# Patient Record
Sex: MALE | Race: WHITE
[De-identification: names, ages, dates, MRNs, and addresses within clinical notes are randomized per-mention and may not be internally consistent; named-entity substitution may affect disease eponyms.]

---

## 2020-02-01 ENCOUNTER — HOSPITAL ENCOUNTER (EMERGENCY)
Dept: HOSPITAL 97 - ER | Age: 45
Discharge: HOME | End: 2020-02-01
Payer: SELF-PAY

## 2020-02-01 VITALS — DIASTOLIC BLOOD PRESSURE: 82 MMHG | SYSTOLIC BLOOD PRESSURE: 127 MMHG

## 2020-02-01 VITALS — TEMPERATURE: 97.8 F | OXYGEN SATURATION: 100 %

## 2020-02-01 DIAGNOSIS — R07.9: Primary | ICD-10-CM

## 2020-02-01 DIAGNOSIS — I10: ICD-10-CM

## 2020-02-01 DIAGNOSIS — F17.210: ICD-10-CM

## 2020-02-01 LAB
ALBUMIN SERPL BCP-MCNC: 3.9 G/DL (ref 3.4–5)
ALP SERPL-CCNC: 68 U/L (ref 45–117)
ALT SERPL W P-5'-P-CCNC: 25 U/L (ref 12–78)
AST SERPL W P-5'-P-CCNC: 16 U/L (ref 15–37)
BUN BLD-MCNC: 13 MG/DL (ref 7–18)
GLUCOSE SERPLBLD-MCNC: 103 MG/DL (ref 74–106)
HCT VFR BLD CALC: 44.2 % (ref 39.6–49)
INR BLD: 1
LYMPHOCYTES # SPEC AUTO: 2.6 K/UL (ref 0.7–4.9)
MAGNESIUM SERPL-MCNC: 1.9 MG/DL (ref 1.8–2.4)
NT-PROBNP SERPL-MCNC: 16 PG/ML (ref ?–125)
PMV BLD: 8.5 FL (ref 7.6–11.3)
POTASSIUM SERPL-SCNC: 4.2 MMOL/L (ref 3.5–5.1)
RBC # BLD: 4.79 M/UL (ref 4.33–5.43)
TROPONIN (EMERG DEPT USE ONLY): < 0.02 NG/ML (ref 0–0.04)

## 2020-02-01 PROCEDURE — 36415 COLL VENOUS BLD VENIPUNCTURE: CPT

## 2020-02-01 PROCEDURE — 80076 HEPATIC FUNCTION PANEL: CPT

## 2020-02-01 PROCEDURE — 85025 COMPLETE CBC W/AUTO DIFF WBC: CPT

## 2020-02-01 PROCEDURE — 99285 EMERGENCY DEPT VISIT HI MDM: CPT

## 2020-02-01 PROCEDURE — 84484 ASSAY OF TROPONIN QUANT: CPT

## 2020-02-01 PROCEDURE — 93005 ELECTROCARDIOGRAM TRACING: CPT

## 2020-02-01 PROCEDURE — 83735 ASSAY OF MAGNESIUM: CPT

## 2020-02-01 PROCEDURE — 85379 FIBRIN DEGRADATION QUANT: CPT

## 2020-02-01 PROCEDURE — 85610 PROTHROMBIN TIME: CPT

## 2020-02-01 PROCEDURE — 71045 X-RAY EXAM CHEST 1 VIEW: CPT

## 2020-02-01 PROCEDURE — 83880 ASSAY OF NATRIURETIC PEPTIDE: CPT

## 2020-02-01 PROCEDURE — 80048 BASIC METABOLIC PNL TOTAL CA: CPT

## 2020-02-01 NOTE — ER
Nurse's Notes                                                                                     

 [facility]                                                                 

Name: Varghese Johnston                                                                               

Age: 44 yrs                                                                                       

Sex: Male                                                                                         

: 1975                                                                                   

MRN: [id number]                                                                                   

Arrival Date: 2020                                                                          

Time: 13:10                                                                                       

Account#: [id number]                                                                            

Bed 14                                                                                            

Private MD:                                                                                       

Diagnosis: Chest pain, unspecified                                                                

                                                                                                  

Presentation:                                                                                     

                                                                                             

13:13 Presenting complaint: Intermittent sharp left sided chest pain upon waking today. Also  hb  

      c/o headache, nausea, and mild SOB. Denies cough/fever. Transition of care: patient was     

      not received from another setting of care. Onset of symptoms was 2020.         

      Risk Assessment: Do you want to hurt yourself or someone else? Patient reports no           

      desire to harm self or others. Initial Sepsis Screen: Does the patient meet any 2           

      criteria? No. Patient's initial sepsis screen is negative. Does the patient have a          

      suspected source of infection? No. Patient's initial sepsis screen is negative. Care        

      prior to arrival: None.                                                                     

13:13 Method Of Arrival: Ambulatory                                                           hb  

13:13 Method Of Arrival: Ambulatory                                                           hb  

13:13 Acuity: AGAPITO 3                                                                           hb  

                                                                                                  

Historical:                                                                                       

- Allergies:                                                                                      

13:15 No Known Allergies;                                                                     hb  

- Home Meds:                                                                                      

13:15 levothyroxine oral [Active];                                                            hb  

- PMHx:                                                                                           

13:15 Broken Neck; Hypertension; Kidney stones;                                               hb  

- PSHx:                                                                                           

13:15 ACDF C4-C5; Stent in Ureter; Lithotripsy;                                               hb  

                                                                                                  

- Immunization history:: Adult Immunizations up to date.                                          

- Coronavirus screen:: The patient has NOT traveled to [place], [place], or [place] in the           

  past 14 days. The patient has NOT had contact with known/suspected case of                      

  Coronavirus? Proceed with normal triage procedures.                                             

- Social history:: Smoking status: Patient reports the use of cigarette tobacco                   

  products, smokes one pack cigarettes per day.                                                   

- Ebola Screening: : No symptoms or risks identified at this time.                                

                                                                                                  

                                                                                                  

Screenin:42 Abuse screen: Denies threats or abuse. Denies injuries from another. Nutritional        aj1 

      screening: No deficits noted. Tuberculosis screening: No symptoms or risk factors           

      identified.                                                                                 

19:11 Fall Risk None identified.                                                              aj1 

                                                                                                  

Assessment:                                                                                       

13:42 General: Appears in no apparent distress. comfortable, Behavior is calm, cooperative,   aj1 

      appropriate for age. Pain: Complains of pain in anterior aspect of left upper chest         

      Pain does not radiate. Pain currently is 8 out of 10 on a pain scale. Quality of pain       

      is described as aching, Pain began upon waking this morning. Neuro: Level of                

      Consciousness is awake, alert, obeys commands, Oriented to person, place, time,             

      situation. Cardiovascular: Reports chest pain, Heart tones S1 S2 present Patient's skin     

      is warm and dry. Rhythm is sinus rhythm. Respiratory: Airway is patent Respiratory          

      effort is even, unlabored, Respiratory pattern is regular, symmetrical, Breath sounds       

      are clear bilaterally. GI: No signs and/or symptoms were reported involving the             

      gastrointestinal system. : No signs and/or symptoms were reported regarding the           

      genitourinary system. EENT: No signs and/or symptoms were reported regarding the EENT       

      system. Derm: No signs and/or symptoms reported regarding the dermatologic system. Skin     

      is pink, warm \T\ dry. normal. Musculoskeletal: No signs and/or symptoms reported           

      regarding the musculoskeletal system. Circulation, motion, and sensation intact.            

14:38 Reassessment: Patient appears in no apparent distress at this time. No changes from     aj1 

      previously documented assessment. Patient and/or family updated on plan of care and         

      expected duration. Pain level reassessed. Patient is alert, oriented x 3, equal             

      unlabored respirations, skin warm/dry/pink.                                                 

15:54 Reassessment: Patient appears in no apparent distress at this time. No changes from     aj1 

      previously documented assessment. Patient and/or family updated on plan of care and         

      expected duration. Pain level reassessed. Patient is alert, oriented x 3, equal             

      unlabored respirations, skin warm/dry/pink.                                                 

16:49 Reassessment: Patient and/or family updated on plan of care and expected duration. Pain aj1 

      level reassessed. General: Appears in no apparent distress. comfortable, Behavior is        

      calm, cooperative, appropriate for age. Pain:. Neuro: Level of Consciousness is awake,      

      alert, obeys commands, Oriented to person, place, time, situation. Cardiovascular:          

      Heart tones S1 S2 present Patient's skin is warm and dry. Rhythm is sinus rhythm.           

      Respiratory: Airway is patent Respiratory effort is even, unlabored, Respiratory            

      pattern is regular, symmetrical. Derm: No signs and/or symptoms reported regarding the      

      dermatologic system. Skin is pink, warm \T\ dry. normal.                                    

17:45 Reassessment: Patient appears in no apparent distress at this time. No changes from     aj1 

      previously documented assessment. Patient and/or family updated on plan of care and         

      expected duration. Pain level reassessed. Patient is alert, oriented x 3, equal             

      unlabored respirations, skin warm/dry/pink.                                                 

18:45 Reassessment: Patient appears in no apparent distress at this time. No changes from     aj1 

      previously documented assessment. Patient and/or family updated on plan of care and         

      expected duration. Pain level reassessed. Patient is alert, oriented x 3, equal             

      unlabored respirations, skin warm/dry/pink.                                                 

                                                                                                  

Vital Signs:                                                                                      

13:14  / 96; Pulse 101; Resp 16; Temp 97.8; Pulse Ox 100% on R/A; Weight 108.86 kg;     hb  

      Height 5 ft. 10 in. (177.80 cm); Pain 7/10;                                                 

15:54  / 82; Pulse 84; Resp 18; Pulse Ox 100% on R/A;                                   aj1 

13:14 Body Mass Index 34.44 (108.86 kg, 177.80 cm)                                            hb  

                                                                                                  

ED Course:                                                                                        

13:10 Patient arrived in ED.                                                                  mr  

13:14 Triage completed.                                                                       hb  

13:14 Arm band placed on.                                                                     hb  

13:16 Josue Sumner PA is PHCP.                                                              [facility] 

13:16 Leo Taylor MD is Attending Physician.                                             [facility] 

13:18 Holly Purcell, RN is Primary Nurse.                                                   aj1 

13:42 Patient has correct armband on for positive identification. Cardiac monitor on. Pulse   aj1 

      ox on. NIBP on.                                                                             

13:42 No provider procedures requiring assistance completed. Initial lab(s) drawn, sent to    [facility] 

      lab. Inserted saline lock: 20 gauge in right antecubital area, using aseptic technique.     

      Blood collected. Patient maintains SpO2 saturation greater than 95% on room air.            

13:55 XRAY Chest (1 view) In Process Unspecified.                                             EDMS

19:10 IV discontinued, intact, bleeding controlled, No redness/swelling at site. Pressure     aj1 

      dressing applied.                                                                           

                                                                                                  

Administered Medications:                                                                         

No medications were administered                                                                  

                                                                                                  

                                                                                                  

Outcome:                                                                                          

18:39 Discharge ordered by MD.                                                                [facility] 

19:11 Discharged to home ambulatory.                                                          aj1 

19:11 Condition: good                                                                             

19:11 Discharge instructions given to patient, Instructed on discharge instructions, follow       

      up and referral plans. Demonstrated understanding of instructions, follow-up care.          

19:12 Patient left the ED.                                                                    aj 

                                                                                                  

Signatures:                                                                                       

Dispatcher [employer]                           EDMS                                                 

Binh, Holly, RN                     RN   aj1                                                  

Josue Sumner PA PA jmm Rivera Maddie                                 mr                                                   

Elham Julien, SHYANNE                     RN   hb                                                   

                                                                                                  

**************************************************************************************************

## 2020-02-01 NOTE — RAD REPORT
EXAM DESCRIPTION:  RAD - Chest Single View - 2/1/2020 1:54 pm

 

CLINICAL HISTORY:  CHEST PAIN

Chest pain.

 

COMPARISON:  Chest Single View dated 4/13/2017

 

FINDINGS:  Portable technique limits examination quality.

 

The lungs are grossly clear. The heart is normal in size. No displaced fractures.

 

IMPRESSION:  No acute intrathoracic process suspected.

## 2020-02-01 NOTE — EDPHYS
Physician Documentation                                                                           

 [facility]                                                                 

Name: Varghese Johnston                                                                               

Age: 44 yrs                                                                                       

Sex: Male                                                                                         

: 1975                                                                                   

MRN: [id number]                                                                                   

Arrival Date: 2020                                                                          

Time: 13:10                                                                                       

Account#: [id number]                                                                            

Bed 14                                                                                            

Private MD:                                                                                       

ED Physician Leo Taylor                                                                      

HPI:                                                                                              

                                                                                             

13:26 This 44 yrs old  Male presents to ER via Ambulatory with complaints of Chest   jmm 

      Pain.                                                                                       

13:26 The patient or guardian reports chest pain that is located primarily in the substernal  [facility] 

      area. Onset: this morning. The pain does not radiate. Associated signs and symptoms:        

      Pertinent positives: headache, shortness of breath, Pertinent negatives: abdominal          

      pain. The chest pain is described as aching, a pressure, sharp. Duration: The patient       

      or guardian reports a single episode, that is still ongoing, that lasted 4 hour(s).         

      Modifying factors: The symptoms are alleviated by nothing. the symptoms are aggravated      

      by nothing. This is a 44 year old male with a history of htn, hlp that presents to the      

      ED with complaints of substernal chest pain which began as he awoke today at 0800.          

      Patient describes the pain as pressure. Which alternates with sharp pain. Patient does      

      smoke. Family hx of CAD.                                                                    

                                                                                                  

Historical:                                                                                       

- Allergies:                                                                                      

13:15 No Known Allergies;                                                                     hb  

- Home Meds:                                                                                      

13:15 levothyroxine oral [Active];                                                            hb  

- PMHx:                                                                                           

13:15 Broken Neck; Hypertension; Kidney stones;                                               hb  

- PSHx:                                                                                           

13:15 ACDF C4-C5; Stent in Ureter; Lithotripsy;                                               hb  

                                                                                                  

- Immunization history:: Adult Immunizations up to date.                                          

- Coronavirus screen:: The patient has NOT traveled to [place], [place], or [place] in the           

  past 14 days. The patient has NOT had contact with known/suspected case of                      

  Coronavirus? Proceed with normal triage procedures.                                             

- Social history:: Smoking status: Patient reports the use of cigarette tobacco                   

  products, smokes one pack cigarettes per day.                                                   

- Ebola Screening: : No symptoms or risks identified at this time.                                

                                                                                                  

                                                                                                  

ROS:                                                                                              

13:26 Constitutional: Negative for fever, chills, and weight loss.                            jmm 

13:26 Cardiovascular: Positive for chest pain.                                                    

13:26 Abdomen/GI: Positive for nausea.                                                            

13:26 Neuro: Positive for headache.                                                               

13:26 All other systems are negative.                                                             

                                                                                                  

Exam:                                                                                             

13:26 Constitutional:  This is a well developed, well nourished patient who is awake, alert,  jmm 

      and in no acute distress. Head/Face:  atraumatic. Eyes:  EOMI, no conjunctival erythema     

      appreciated ENT:  Moist Mucus Membranes Neck:  Trachea midline, Supple Chest/axilla:        

      Normal chest wall appearance and motion.                                                    

13:26 Abdomen/GI:  Non distended, soft Back:  Normal ROM Skin:  General appearance color          

      normal MS/ Extremity:  Moves all extremities, no obvious deformities appreciated, no        

      edema noted to the lower extremities  Neuro:  Awake and alert, normal gait Psych:           

      Behavior is normal, Mood is normal, Patient is cooperative and pleasant                     

13:26 Cardiovascular: Rate: normal, Rhythm: regular.                                              

13:26 Respiratory: the patient does not display signs of respiratory distress,  Respirations:     

      normal, Breath sounds: are clear throughout.                                                

                                                                                                  

Vital Signs:                                                                                      

13:14  / 96; Pulse 101; Resp 16; Temp 97.8; Pulse Ox 100% on R/A; Weight 108.86 kg;     hb  

      Height 5 ft. 10 in. (177.80 cm); Pain 7/10;                                                 

15:54  / 82; Pulse 84; Resp 18; Pulse Ox 100% on R/A;                                   aj1 

13:14 Body Mass Index 34.44 (108.86 kg, 177.80 cm)                                            hb  

                                                                                                  

MDM:                                                                                              

13:19 Patient medically screened.                                                             benito 

18:38 Data reviewed: vital signs, nurses notes. ED course: HEART SCORE IS 2. REPEAT TROPONIN  jm 

      NORMAL. CHEST PAIN HAS RESOLVED. PATIENT ADVISED TO FOLLOW UP WITH PCP AND OTHERWISE        

      GIVEN STRICT RETURN PRECAUTIONS. .                                                          

                                                                                                  

                                                                                             

13:26 Order name: Basic Metabolic Panel; Complete Time: 14:12                                 [facility] 

                                                                                             

13:26 Order name: CBC with Diff; Complete Time: 13:56                                         [facility] 

                                                                                             

13:26 Order name: LFT's; Complete Time: 14:12                                                 [facility] 

                                                                                             

13:26 Order name: Magnesium; Complete Time: 14:12                                             [facility] 

                                                                                             

13:26 Order name: NT PRO-BNP; Complete Time: 14:12                                            [facility] 

                                                                                             

13:26 Order name: PT-INR; Complete Time: 13:56                                                [facility] 

                                                                                             

13:26 Order name: Troponin (emerg Dept Use Only); Complete Time: 14:12                        [facility] 

                                                                                             

13:26 Order name: XRAY Chest (1 view); Complete Time: 14:58                                   [facility] 

                                                                                             

13:26 Order name: EKG; Complete Time: 13:29                                                   [facility] 

                                                                                             

13:26 Order name: Cardiac monitoring; Complete Time: 13:30                                    [facility] 

                                                                                             

13:26 Order name: EKG - Nurse/Tech; Complete Time: 13:41                                      [facility] 

                                                                                             

13:26 Order name: IV Saline Lock; Complete Time: 13:41                                        [facility] 

                                                                                             

15:11 Order name: D-Dimer; Complete Time: 15:30                                               [facility] 

                                                                                             

17:37 Order name: Troponin (emerg Dept Use Only); Complete Time: 18:32                        [facility] 

                                                                                             

13:26 Order name: Labs collected and sent; Complete Time: 13:41                               [facility] 

                                                                                             

13:26 Order name: O2 Per Protocol; Complete Time: 13:30                                       [facility] 

                                                                                             

13:26 Order name: O2 Sat Monitoring; Complete Time: 13:30                                     [facility] 

                                                                                                  

Administered Medications:                                                                         

No medications were administered                                                                  

                                                                                                  

                                                                                                  

Disposition:                                                                                      

20 18:39 Discharged to Home. Impression: Chest pain, unspecified.                           

- Condition is Stable.                                                                            

- Discharge Instructions: Nonspecific Chest Pain.                                                 

                                                                                                  

- Medication Reconciliation Form, Thank You Letter, Antibiotic Education, Prescription            

  Opioid Use form.                                                                                

- Follow up: Private Physician; When: 2 - 3 days; Reason: Recheck today's complaints,             

  Continuance of care, Re-evaluation by your physician.                                           

                                                                                                  

                                                                                                  

                                                                                                  

Addendum:                                                                                         

2020                                                                                        

     07:29 Co-signature as Attending Physician, Leo Taylor MD I agree with the assessment and  c
ha

           plan of care.                                                                          

                                                                                                  

Signatures:                                                                                       

Dispatcher [employer]                           Holly Patiño RN                     RN   aj1                                                  

Leo Taylor MD MD cha Mickail, Joel, PA                       PA   m                                                  

Elham Julien RN RN                                                      

                                                                                                  

Corrections: (The following items were deleted from the chart)                                    

                                                                                             

19:12 18:39 2020 18:39 Discharged to Home. Impression: Chest pain, unspecified.         aj1 

      Condition is Stable. Forms are Medication Reconciliation Form, Thank You Letter,            

      Antibiotic Education, Prescription Opioid Use. Follow up: Private Physician; When: 2 -      

      3 days; Reason: Recheck today's complaints, Continuance of care, Re-evaluation by your      

      physician. [facility]                                                                              

                                                                                                  

**************************************************************************************************

## 2020-02-03 NOTE — EKG
Test Date:    2020-02-01               Test Time:    13:40:06

Technician:   ELISEO                                    

                                                     

MEASUREMENT RESULTS:                                       

Intervals:                                           

Rate:         88                                     

DE:           164                                    

QRSD:         84                                     

QT:           344                                    

QTc:          416                                    

Axis:                                                

P:            46                                     

DE:           164                                    

QRS:          68                                     

T:            55                                     

                                                     

INTERPRETIVE STATEMENTS:                                       

                                                     

Normal sinus rhythm

Normal ECG

Compared to ECG 04/13/2017 07:35:41

No significant changes



Electronically Signed On 02-03-20 05:29:36 CST by Sukhjinder Castillo

## 2020-10-01 ENCOUNTER — HOSPITAL ENCOUNTER (INPATIENT)
Dept: HOSPITAL 97 - ER | Age: 45
LOS: 2 days | Discharge: HOME | DRG: 391 | End: 2020-10-03
Attending: INTERNAL MEDICINE | Admitting: FAMILY MEDICINE
Payer: SELF-PAY

## 2020-10-01 VITALS — BODY MASS INDEX: 36.3 KG/M2

## 2020-10-01 DIAGNOSIS — I10: ICD-10-CM

## 2020-10-01 DIAGNOSIS — E03.9: ICD-10-CM

## 2020-10-01 DIAGNOSIS — E86.0: ICD-10-CM

## 2020-10-01 DIAGNOSIS — K76.0: ICD-10-CM

## 2020-10-01 DIAGNOSIS — Q60.0: ICD-10-CM

## 2020-10-01 DIAGNOSIS — Z91.14: ICD-10-CM

## 2020-10-01 DIAGNOSIS — N17.9: ICD-10-CM

## 2020-10-01 DIAGNOSIS — U07.1: ICD-10-CM

## 2020-10-01 DIAGNOSIS — N26.1: ICD-10-CM

## 2020-10-01 DIAGNOSIS — K52.9: Primary | ICD-10-CM

## 2020-10-01 DIAGNOSIS — F17.290: ICD-10-CM

## 2020-10-01 LAB
ALBUMIN SERPL BCP-MCNC: 4.4 G/DL (ref 3.4–5)
ALP SERPL-CCNC: 75 U/L (ref 45–117)
ALT SERPL W P-5'-P-CCNC: 30 U/L (ref 12–78)
AST SERPL W P-5'-P-CCNC: 11 U/L (ref 15–37)
BUN BLD-MCNC: 20 MG/DL (ref 7–18)
GLUCOSE SERPLBLD-MCNC: 120 MG/DL (ref 74–106)
HCT VFR BLD CALC: 54.9 % (ref 39.6–49)
LIPASE SERPL-CCNC: 39 U/L (ref 73–393)
LYMPHOCYTES # SPEC AUTO: 1.7 K/UL (ref 0.7–4.9)
PMV BLD: 8.7 FL (ref 7.6–11.3)
POTASSIUM SERPL-SCNC: 3.8 MMOL/L (ref 3.5–5.1)
RBC # BLD: 6.11 M/UL (ref 4.33–5.43)
UA COMPLETE W REFLEX CULTURE PNL UR: (no result)
UA DIPSTICK W REFLEX MICRO PNL UR: (no result)
URINE COARSE GRANULAR CASTS: (no result) /LPF

## 2020-10-01 PROCEDURE — 83970 ASSAY OF PARATHORMONE: CPT

## 2020-10-01 PROCEDURE — 82274 ASSAY TEST FOR BLOOD FECAL: CPT

## 2020-10-01 PROCEDURE — 85610 PROTHROMBIN TIME: CPT

## 2020-10-01 PROCEDURE — 84156 ASSAY OF PROTEIN URINE: CPT

## 2020-10-01 PROCEDURE — 87324 CLOSTRIDIUM AG IA: CPT

## 2020-10-01 PROCEDURE — 99285 EMERGENCY DEPT VISIT HI MDM: CPT

## 2020-10-01 PROCEDURE — 84439 ASSAY OF FREE THYROXINE: CPT

## 2020-10-01 PROCEDURE — 96374 THER/PROPH/DIAG INJ IV PUSH: CPT

## 2020-10-01 PROCEDURE — 83735 ASSAY OF MAGNESIUM: CPT

## 2020-10-01 PROCEDURE — 82565 ASSAY OF CREATININE: CPT

## 2020-10-01 PROCEDURE — 83690 ASSAY OF LIPASE: CPT

## 2020-10-01 PROCEDURE — 81003 URINALYSIS AUTO W/O SCOPE: CPT

## 2020-10-01 PROCEDURE — 87045 FECES CULTURE AEROBIC BACT: CPT

## 2020-10-01 PROCEDURE — 76770 US EXAM ABDO BACK WALL COMP: CPT

## 2020-10-01 PROCEDURE — 87177 OVA AND PARASITES SMEARS: CPT

## 2020-10-01 PROCEDURE — 94010 BREATHING CAPACITY TEST: CPT

## 2020-10-01 PROCEDURE — 85025 COMPLETE CBC W/AUTO DIFF WBC: CPT

## 2020-10-01 PROCEDURE — 82570 ASSAY OF URINE CREATININE: CPT

## 2020-10-01 PROCEDURE — 87046 STOOL CULTR AEROBIC BACT EA: CPT

## 2020-10-01 PROCEDURE — 80048 BASIC METABOLIC PNL TOTAL CA: CPT

## 2020-10-01 PROCEDURE — 87449 NOS EACH ORGANISM AG IA: CPT

## 2020-10-01 PROCEDURE — 74177 CT ABD & PELVIS W/CONTRAST: CPT

## 2020-10-01 PROCEDURE — 81015 MICROSCOPIC EXAM OF URINE: CPT

## 2020-10-01 PROCEDURE — 87209 SMEAR COMPLEX STAIN: CPT

## 2020-10-01 PROCEDURE — 84443 ASSAY THYROID STIM HORMONE: CPT

## 2020-10-01 PROCEDURE — 80076 HEPATIC FUNCTION PANEL: CPT

## 2020-10-01 PROCEDURE — 36415 COLL VENOUS BLD VENIPUNCTURE: CPT

## 2020-10-01 PROCEDURE — 87040 BLOOD CULTURE FOR BACTERIA: CPT

## 2020-10-01 PROCEDURE — 96361 HYDRATE IV INFUSION ADD-ON: CPT

## 2020-10-01 RX ADMIN — ENOXAPARIN SODIUM SCH MG: 40 INJECTION SUBCUTANEOUS at 17:37

## 2020-10-01 RX ADMIN — DEXTROSE AND SODIUM CHLORIDE SCH MLS: 5; .45 INJECTION, SOLUTION INTRAVENOUS at 13:45

## 2020-10-01 RX ADMIN — HYDROCODONE BITARTRATE AND ACETAMINOPHEN PRN TAB: 7.5; 325 TABLET ORAL at 21:17

## 2020-10-01 RX ADMIN — TAZOBACTAM SODIUM AND PIPERACILLIN SODIUM SCH MLS: 250; 2 INJECTION, SOLUTION INTRAVENOUS at 13:45

## 2020-10-01 RX ADMIN — DEXTROSE AND SODIUM CHLORIDE SCH: 5; .45 INJECTION, SOLUTION INTRAVENOUS at 20:00

## 2020-10-01 RX ADMIN — DEXTROSE AND SODIUM CHLORIDE SCH MLS: 5; .45 INJECTION, SOLUTION INTRAVENOUS at 21:52

## 2020-10-01 RX ADMIN — TAZOBACTAM SODIUM AND PIPERACILLIN SODIUM SCH MLS: 250; 2 INJECTION, SOLUTION INTRAVENOUS at 17:37

## 2020-10-01 RX ADMIN — Medication SCH: at 20:47

## 2020-10-01 NOTE — P.CNS
Date of Consult: 10/01/20


PC:  I was asked to see this 45-year-old male regards to abdominal pain and 

possible appendicitis.





HPC:  Patient has had about a 4 day history of abdominal pain. Describes it as 

around his emboli kiss and mostly on the left side but also the right upper 

quadrant as well. Says he feels like he is hard cramps, and has been having 

diarrhea for the last 3 or 4 days.





PMH:  Has had some type of kidney issues in the past.  He was apparently told he

had a nonfunctioning kidney.  Has had stones on that side.





PSHx:  Previous ureteric stent placement





SOC:  No known allergies





SYS REVIEW:  States he has otherwise been in good health.











O/E awake alert not in any acute distress at the moment but looks tired.  Vital 

signs are stable








HEENT:  Nonicteric





Chest:  Chest movement equal bilaterally





ABD:  Soft nontender no guarding or rebound





LOCO:  Intact





DATA:  Elevated white cell count, CT scan suggestive of enteritis and/or early 

appendicitis





IMPRESSION:  This patient has no peritoneal signs. This is most likely a 

gastroenteritis from his symptoms and history





PLAN:  Continue antibiotics, stool cultures, and IV hydration.  Repeat CBC in 

a.m..  I do not feel this is a surgical issue at this time.

## 2020-10-01 NOTE — EDPHYS
Office Visit    Assessment AND Plan     diabetes, continues to have fluctuation in her sugar readings, I ordered a A1c, continue current regimen and keep appt with endocrinologist   history DVT, no recurrence of symptoms. INR therapeutic last month, repeat today  Anxiety with depression, controlled, contain current regimen      CHIEF COMPLAINT    Follow-up (6 month follow-up)        History of present illness    She is here today for follow-up a  Has diabetes, continues to have fluctuation in her sugar readings, under care of endocrinologist, not reporting any hypoglycemia  On Coumadin for history of DVT and PE, no peripheral chest pain  Has mild lower abdominal pain, recent UA done at the nephrologist revealed possible bladder infection, the patient does not have any hematuria no dysuria  On Prozac for anxiety with depression, symptoms fairly controlled     I have reviewed the past medical, family and social history sections including the medications and allergies listed in the above medical record as well as the nursing notes.     Review of systems    No orthopnea PND    Physical Exam    Vital Signs:  Blood pressure 136/82, pulse 86, resp. rate 16, height 5' 7\" (1.702 m), weight 71.3 kg.  Constitutional:  No acute distress.  Integument:  Warm.  Dry.  No erythema.  No rash.    HENT:  Normocephalic.  Atraumatic.  Bilateral external ears normal.  Oropharynx moist.  No oral exudates.  Nose normal.   Neck:  Normal range of motion.  No tenderness.  Supple.  No stridor.    Eyes:  PERRL (Pupils equal, round, reactive to light), EOMI (extraocular movements intact).  Conjunctivae normal.  No discharge.    Cardiovascular:  Normal heart rate.  Normal rhythm.  No murmurs.  No rubs.  No gallops.    Respiratory:  Normal breath sounds.  No respiratory distress.  No wheezing.  No chest tenderness.    Gastrointestinal:  Bowel sounds normal.  Soft.  No tenderness.  No masses.  No pulsatile masses.  Status post colostomy  Physician Documentation                                                                           

 [facility]                                                                 

Name: Varghese Johnston                                                                               

Age: 45 yrs                                                                                       

Sex: Male                                                                                         

: 1975                                                                                   

MRN: [id number]                                                                                   

Arrival Date: 10/01/2020                                                                          

Time: 08:09                                                                                       

Account#: [id number]                                                                            

Bed 7                                                                                             

Private MD: Nayla Peñaloza                                                                        

ED Physician Dionicio Summers                                                                       

HPI:                                                                                              

10/01                                                                                             

09:47 This 45 yrs old  Male presents to ER via Ambulatory with complaints of         kdr 

      Abdominal Pain, Vomiting/Diarrhea.                                                          

09:47 The patient presents to the emergency department with nausea, that is mild, vomiting,   kdr 

      that is intermittent, diarrhea, that is intermittent, abdominal pain, of the abdomen        

      diffusely, described as achy, and does not radiate. Onset: The symptoms/episode             

      began/occurred gradually, 4 day(s) ago. Possible causes: unknown. The symptoms are          

      aggravated by nothing. The symptoms are alleviated by food . Associated signs and           

      symptoms: Pertinent positives: abdominal pain, anorexia, diarrhea, nausea, vomiting.        

      Severity of symptoms: At their worst the symptoms were mild moderate just prior to          

      arrival, in the emergency department the symptoms are unchanged. The patient has not        

      experienced similar symptoms in the past. The patient has not recently seen a physician.    

                                                                                                  

Historical:                                                                                       

- Allergies:                                                                                      

08:24 No Known Allergies;                                                                     ss  

- Home Meds:                                                                                      

08:24 "supposed to take thyroid medicaiton" [Active];                                         ss  

- PMHx:                                                                                           

08:24 Broken Neck; Kidney stones; Hypothyroidism;                                             ss  

- PSHx:                                                                                           

08:24 neck surgery;                                                                           ss  

                                                                                                  

- Immunization history:: Adult Immunizations up to date.                                          

- Social history:: Smoking status: Patient reports use of chewing tobacco.                        

                                                                                                  

                                                                                                  

ROS:                                                                                              

09:47 Constitutional: Negative for fever, chills, and weight loss, Eyes: Negative for injury, kdr 

      pain, redness, and discharge, ENT: Negative for injury, pain, and discharge, Neck:          

      Negative for injury, pain, and swelling, Cardiovascular: Negative for chest pain,           

      palpitations, and edema, Respiratory: Negative for shortness of breath, cough,              

      wheezing, and pleuritic chest pain, Back: Negative for injury and pain, : Negative        

      for injury, bleeding, discharge, and swelling, MS/Extremity: Negative for injury and        

      deformity, Skin: Negative for injury, rash, and discoloration, Neuro: Negative for          

      headache, weakness, numbness, tingling, and seizure activity. Psych: Negative for           

      depression, anxiety, suicide ideation, homicidal ideation, and hallucinations,              

      Allergy/Immunology: Negative for hives, rash, and allergies, Endocrine: Negative for        

      neck swelling, polydipsia, polyuria, polyphagia, and marked weight changes,                 

      Hematologic/Lymphatic: Negative for swollen nodes, abnormal bleeding, and unusual           

      bruising.                                                                                   

09:47 Abdomen/GI: Positive for abdominal pain, nausea, vomiting, and diarrhea, Negative for       

      anorexia, dysphagia, black/tarry stool, rectal pain, rectal bleeding, bowel                 

      incontinence.                                                                               

                                                                                                  

Exam:                                                                                             

09:47 Constitutional:  This is a well developed, well nourished patient who is awake, alert,  kdr 

      and in no acute distress. Head/Face:  Normocephalic, atraumatic. Eyes:  Pupils equal        

      round and reactive to light, extra-ocular motions intact.  Lids and lashes normal.          

      Conjunctiva and sclera are non-icteric and not injected.  Cornea within normal limits.      

      Periorbital areas with no swelling, redness, or edema. Neck:  Trachea midline, no           

      thyromegaly or masses palpated, and no cervical lymphadenopathy.  Supple, full range of     

      motion without nuchal rigidity, or vertebral point tenderness.  No Meningismus.             

      Chest/axilla:  Normal chest wall appearance and motion.  Nontender with no deformity.       

      No lesions are appreciated. Cardiovascular:  Regular rate and rhythm with a normal S1       

      and S2.  No gallops, murmurs, or rubs.  Normal PMI, no JVD.  No pulse deficits.             

      Respiratory:  Lungs have equal breath sounds bilaterally, clear to auscultation and         

      percussion.  No rales, rhonchi or wheezes noted.  No increased work of breathing, no        

      retractions or nasal flaring. Back:  No spinal tenderness.  No costovertebral               

      tenderness.  Full range of motion. Skin:  Warm, dry with normal turgor.  Normal color       

      with no rashes, no lesions, and no evidence of cellulitis. MS/ Extremity:  Pulses           

      equal, no cyanosis.  Neurovascular intact.  Full, normal range of motion. Neuro:  Awake     

      and alert, GCS 15, oriented to person, place, time, and situation.  Cranial nerves          

      II-XII grossly intact.  Motor strength 5/5 in all extremities.  Sensory grossly intact.     

       Cerebellar exam normal.  Normal gait. Psych:  Awake, alert, with orientation to            

      person, place and time.  Behavior, mood, and affect are within normal limits.               

09:47 Abdomen/GI: Inspection: obese Bowel sounds: active, all quadrants, Palpation: soft,         

      mild abdominal tenderness, in the abdomen diffusely.                                        

                                                                                                  

Vital Signs:                                                                                      

08:12  / 80; Pulse 67; Resp 15; Pulse Ox 95% on R/A;                                    ss  

08:21  / 87; Pulse 111; Resp 17; Temp 98.3(O); Pulse Ox 96% on R/A; Weight 113.4 kg;    ss  

      Height 5 ft. 10 in. (177.80 cm); Pain 4/10;                                                 

10:20  / 73; Pulse 98; Resp 16; Pulse Ox 98% on R/A;                                    ss  

08:21 Body Mass Index 35.87 (113.40 kg, 177.80 cm)                                            ss  

                                                                                                  

MDM:                                                                                              

09:47 Data reviewed: vital signs, nurses notes, lab test result(s), radiologic studies.       kdr 

      Counseling: I had a detailed discussion with the patient and/or guardian regarding: the     

      historical points, exam findings, and any diagnostic results supporting the                 

      discharge/admit diagnosis, lab results, radiology results.                                  

10:26 ED course: Verbal CT report received from Dr Smalls. Findings consistent with          kb  

      enteritis. Appendix upper limits of normal, no other signs of appendicitis..                

10:29 Patient medically screened.                                                             kdr 

12:25 ED course: Sine the CT scan was not definitive with regard to acute appendicitis, will  kdr 

      observe for further .                                                                       

                                                                                                  

10                                                                                             

08:09 Order name: Basic Metabolic Panel; Complete Time: 09:43                                 kdr 

10/01                                                                                             

08:09 Order name: CBC with Diff; Complete Time: 09:43                                         kdr 

10/01                                                                                             

08:09 Order name: Hepatic Function; Complete Time: 09:43                                      kdr 

10/01                                                                                             

08:09 Order name: Lipase; Complete Time: 09:43                                                kdr 

10/01                                                                                             

08:21 Order name: CT Abd/Pelvis - IV Contrast Only                                            kdr 

10/01                                                                                             

08:09 Order name: IV Saline Lock; Complete Time: 09:29                                        kdr 

10/01                                                                                             

08:09 Order name: Labs collected and sent; Complete Time: 09:29                               kdr 

                                                                                                  

Administered Medications:                                                                         

08:56 Drug: NS 0.9% 1000 ml Route: IV; Rate: 1 bolus; Site: left antecubital;                 ss  

10:57 Follow up: IV Status: Completed infusion                                                  

08:56 Drug: Zofran (Ondansetron) 4 mg Route: IVP; Site: left antecubital;                       

11:30 Follow up: Response: No adverse reaction; Nausea is decreased                           ss  

10:57 Drug: NS 0.9% 1000 ml Route: IV; Rate: 125 ml/hr; Site: left antecubital;               ss  

10:57 Follow up: IV Status: Infusion continued upon admission                                 ss  

                                                                                                  

                                                                                                  

Disposition:                                                                                      

10/02                                                                                             

09:21 Co-signature as Attending Physician, Dionicio Summers MD I agree with the assessment and   kdr 

      plan of care.                                                                               

                                                                                                  

Disposition:                                                                                      

10/01/20 10:29 Hospitalization ordered by Jeff Shepherd for Observation. Preliminary             

  diagnosis are Anuria and oliguria, Dehydration, Abdominal and pelvic pain.                      

- Bed requested for Telemetry/MedSurg (observation).                                              

- Status is Observation.                                                                      ss  

- Condition is Fair.                                                                              

- Problem is new.                                                                                 

- Symptoms have improved.                                                                         

                                                                                                  

                                                                                                  

                                                                                                  

Signatures:                                                                                       

Dispatcher [employer]                           EDMS                                                 

Ayanna Neri, JOSH-C                 JOSH-Dionicio Dior MD MD   [facility]                                                  

Iveth Álvarez RN                      RN                                                      

Jayshree Baeza mt                                                   

                                                                                                  

Corrections: (The following items were deleted from the chart)                                    

10/01                                                                                             

11:01 10:29 Hospitalization Ordered by Jeff Shepherd DO for Observation. Preliminary          mt  

      diagnosis is Anuria and oliguria; Dehydration; Abdominal and pelvic pain. Bed requested     

      for Telemetry/MedSurg (observation). Status is Observation. Condition is Fair. Problem      

      is new. Symptoms have improved. kdr                                                         

11:30 11:01 10/01/2020 10:29 Hospitalization Ordered by eJff Shepherd DO for Observation.     ss  

      Preliminary diagnosis is Anuria and oliguria; Dehydration; Abdominal and pelvic pain.       

      Bed requested for Telemetry/MedSurg (observation). Status is Observation. Condition is      

      Fair. Problem is new. Symptoms have improved. mt                                            

                                                                                                  

**************************************************************************************************   Neurologic:  Alert and oriented x3. No ataxia  gait.  No focal deficits noted.    Lymphatic:  No cervical or supraclavicular lymphadenopathy.  Extremities:  No edema.  Palpable pedal pulses bilaterally.    The patient indicates understanding of these issues and agrees with the plan.

## 2020-10-01 NOTE — RAD REPORT
EXAM DESCRIPTION:  US - Renal Ultrasound-Complete - 10/1/2020 11:02 pm

 

CLINICAL HISTORY:  acute renal injury, Hx of L. nonfunctioning kidney

Flank pain

 

COMPARISON:  Abdomen   Pelvis W Contrast dated 10/1/2020

 

FINDINGS:  Left kidney is highly atrophic in barely visible sonographically. A small cyst is present 
involving the left kidney measuring 2 cm.

 

The right kidney is mildly hypertrophied in size. But normal in shape and echotexture.

 

The right kidney measures 10.1 x 6.3 x 5.7 cm. No hydronephrosis, focal mass or perinephric fluid.

 

The urinary bladder is incompletely distended without gross abnormality seen.

 

IMPRESSION:  Highly atrophic left kidney containing small 2 cm cyst.

 

Mildly hypertrophied but otherwise normal right kidney.

## 2020-10-01 NOTE — P.HP
Certification for Inpatient


Patient admitted to: Inpatient


Patient will require the following post-hospital care: None


Practitioner: I am a practitioner with admitting privileges, knowledge of 

patient current condition, hospital course, and medical plan of care.


Services: Services provided to patient in accordance with Admission requirements

found in Title 42 Section 412.3 of the Code of Federal Regulations





Patient History


Date of Service: 10/01/20


Primary Care Provider: Dustin Nunez


Reason for admission: Nausea, vomiting and diarrhea


History of Present Illness: 





45-year-old  male with history of hypothyroidism, kidney stones, and 

possible left nonfunctioning kidney.





Patient reports nausea, vomiting and diarrhea over the last 4 days.  Over the 

last 24 hr he has had right-sided abdominal pain. Pain is mainly to the upper 

and lower region.  He rated the pain about a 8/10.  Patient patient also reports

some fever, chills.  He denies any chest pain or shortness of breath.  Patient 

has had poor oral intake over the last several days.  Poor urinary output noted.

Diarrhea still persists.  He also reported some epigastric pain today.  Patient 

mentioned that he has been taking Keto pills to help with weight loss.  He also 

uses another type of pill along with this.  He has use this for about 3 weeks.  

Patient came to the ER for further evaluation.





In the ER patient was evaluated.  Vital signs stable.  Patient appeared 

dehydrated.  White count 13.7, hemoglobin 18. Platelet count 320. Neutrophils 

were elevated.  Lipase unremarkable.  Sodium 137, potassium 3.8.  BUN of 20, 

creatinine 1.8 with a GFR 41.  Glucose 120. CT scan as reported by the ER 

physician noted that the patient had borderline low inflammation to the appendix

likely indicating appendicitis.  Patient was given IV fluid bolus in the 

emergency room.  Patient admitted for further evaluation and treatment.





When I saw the patient ER, patient appeared much improved.  He did appear 

dehydrated.  Patient admits taking pills recently over the last 3 weeks for 

weight loss.  Patient has not been on any type of antibiotic therapy.  Patient 

reports about 5 years ago he had problems with hematuria and kidney stones.  At 

that time he was told that he had a nonfunctioning kidney on 1 side.  He has not

followed up with a PCP recently.  Patient patient use to smoke but now dips.  

Drinks rarely.  Patient also reports that he took medication for thyroid in the 

past.  But this was eventually taken off.


Allergies





No Known Allergies Allergy (Unverified 04/13/17 09:42)


   





Home medications list reviewed: Yes


Home Medications: 








NK [No Home Meds]  10/01/20 








- Past Medical/Surgical History


Has patient received pneumonia vaccine in the past: No


Diabetic: No


-: Hypothyroidism


-: Nephrolithiasis


-: Personal history of nonfunctioning kidney on 1 side


-: Kidney stone removal


-: Neck surgery to this C4-C5 region after trauma


Psychosocial/ Personal History: Patient is single.  He currently works as a 

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  He has 3 children.





- Family History


  ** Father


-: Heart disease, Other (see notes) (Hypothyroidism)





  ** Mother


-: Heart disease, Other (see notes) (Hypothyroidism)





- Social History


Smoking Status: Former smoker (But still dips tobacco)


Alcohol use: Yes


CD- Drugs: No


Caffeine use: Yes


Place of Residence: Home





Review of Systems


General: Weakness, As per HPI


Eyes: Unremarkable


ENT: Unremarkable


Respiratory: Unremarkable


Cardiovascular: Unremarkable


Gastrointestinal: Nausea, Vomiting, Abdominal Pain, Diarrhea, As per HPI


Genitourinary: Unremarkable


Musculoskeletal: Unremarkable


Integumentary: Unremarkable


Neurological: Unremarkable


Lymphatics: Unremarkable





Physical Examination





- Vital Signs


Temperature: 98.3 F


Blood Pressure: 122/73


Pulse: 98


Respirations: 16





- Physical Exam


General: Alert, In no apparent distress, Oriented x3, Cooperative


HEENT: Atraumatic, Normocephalic, Other (Dry mucous membranes)


Neck: Supple


Respiratory: Clear to auscultation bilaterally, Normal air movement


Cardiovascular: Normal pulses, Regular rate/rhythm


Gastrointestinal: Normal bowel sounds, Soft and benign, Non-distended, No 

masses, No rebound, No guarding, Tenderness (Some abdominal pain to the right 

and upper lower quadrant)


Musculoskeletal: No erythema, No tenderness, No warmth


Integumentary: No tenderness/swelling, No erythema, No warmth, No cyanosis, 

Other (Dry skin turgor)


Neurological: Normal speech, Normal strength at 5/5 x4 extr, Normal tone, Normal

affect





- Studies


Laboratory Data (last 24 hrs)





10/01/20 08:50: WBC 13.7 H, Hgb 18.4 H, Hct 54.9 H, Plt Count 320


10/01/20 08:50: Sodium 137, Potassium 3.8, BUN 20 H, Creatinine 1.81 H, Glucose 

120 H, Total Bilirubin 0.8, AST 11 L, ALT 30, Alkaline Phosphatase 75, Lipase 39

L








Assessment and Plan





- Plan





Impression:


Nausea, vomiting and diarrhea with noted right-sided abdominal pain with 

possible underlying appendicitis


Acute renal injury with history of nephrolithiasis and personal history of 

unilateral nonfunctioning kidney








Plan:


Nausea, vomiting and diarrhea with noted right-sided abdominal pain with 

possible underlying appendicitis:  Patient admitted for further evaluation and 

treatment.  Will start IV antibiotic therapy.  Will also provide aggressive IV 

fluids.  Will obtain stool cultures, blood cultures.  Case discussed with 

nephrology and surgery who been consulted.  Await further recommendations from 

nephrology.  Will obtain renal ultrasound to further evaluate.  Will keep the 

patient NPO for possible surgical intervention.  Provide medication for pain. 

Will continue to monitor closely.  Provide DVT prophylaxis.  Anticipate 

improvement over the next 72 hr.


Acute renal injury with history of nephrolithiasis and personal history of 

unilateral nonfunctioning kidney:  Continue IV fluids.  Will obtain renal 

ultrasound.  Nephrology consulted to further evaluate and address.








Discharge Plan: Home


Plan to discharge in: 72 Hours





- Advance Directives


Does patient have a Living Will: No


Does patient have a Durable POA for Healthcare: No





- Code Status/Comfort Care


Code Status Assessed: Yes (Patient is full code)


Time Spent Managing Pts Care (In Minutes): 55

## 2020-10-01 NOTE — ER
Nurse's Notes                                                                                     

 Former[facility]                                                                 

Name: Varghese Johnston                                                                               

Age: 45 yrs                                                                                       

Sex: Male                                                                                         

: 1975                                                                                   

MRN: [id number]                                                                                   

Arrival Date: 10/01/2020                                                                          

Time: 08:09                                                                                       

Account#: [id number]                                                                            

Bed 7                                                                                             

Private MD: Nayla Peñaloza                                                                        

Diagnosis: Anuria and oliguria;Dehydration;Abdominal and pelvic pain                              

                                                                                                  

Presentation:                                                                                     

10/01                                                                                             

08:21 Chief complaint: Patient states: Diarrhea and abd cramping x 2 days that became worse   ss  

      last night. N/V that began yesterday. Coronavirus screen: Client denies travel out of       

      the [place] in the last 14 days. Ebola Screen: Patient denies exposure to infectious           

      person. Patient denies travel to an Ebola-affected area in the 21 days before illness       

      onset. Initial Sepsis Screen: Does the patient meet any 2 criteria? No. Patient's           

      initial sepsis screen is negative. Does the patient have a suspected source of              

      infection? No. Patient's initial sepsis screen is negative. Risk Assessment: Do you         

      want to hurt yourself or someone else? Patient reports no desire to harm self or            

      others. Onset of symptoms was 2020.                                           

08:21 Method Of Arrival: Ambulatory                                                           ss  

08:21 Acuity: AGAPITO 3                                                                           ss  

                                                                                                  

Historical:                                                                                       

- Allergies:                                                                                      

08:24 No Known Allergies;                                                                     ss  

- Home Meds:                                                                                      

08:24 "supposed to take thyroid medicaiton" [Active];                                         ss  

- PMHx:                                                                                           

08:24 Broken Neck; Kidney stones; Hypothyroidism;                                             ss  

- PSHx:                                                                                           

08:24 neck surgery;                                                                           ss  

                                                                                                  

- Immunization history:: Adult Immunizations up to date.                                          

- Social history:: Smoking status: Patient reports use of chewing tobacco.                        

                                                                                                  

                                                                                                  

Screenin:12 Abuse screen: Denies threats or abuse. Denies injuries from another. Nutritional        ss  

      screening: No deficits noted. Tuberculosis screening: Never had TB. Fall Risk None          

      identified.                                                                                 

                                                                                                  

Assessment:                                                                                       

08:12 General: Appears in no apparent distress. Behavior is calm, cooperative, Denies fever,  ss  

      chills. General: Denies. Pain: Complains of pain in abdomen Pain currently is 4 out of      

      10 on a pain scale. Quality of pain is described as crampy, Pain began 2-3 days ago. Is     

      intermittent. Neuro: Level of Consciousness is awake, alert, obeys commands, Oriented       

      to person, place, time, situation. Cardiovascular: Capillary refill < 3 seconds is          

      brisk in bilateral fingers. Respiratory: Airway is patent Respiratory effort is even,       

      unlabored, Respiratory pattern is regular, symmetrical. GI: Bowel sounds present X 4        

      quads. Abdomen is tender to palpation in right lower quadrant. : Reports unable to        

      urinate x 2 days. EENT: Nares are clear Oral mucosa is moist. Derm: Skin is intact, is      

      healthy with good turgor, Skin is pink, warm \T\ dry. normal. Musculoskeletal:              

      Circulation, motion, and sensation intact. Range of motion: intact in all extremities,      

      Swelling absent.                                                                            

10:21 Reassessment: Patient appears in no apparent distress at this time. Patient and/or      ss  

      family updated on plan of care and expected duration. Pain level reassessed. Patient is     

      alert, oriented x 3, equal unlabored respirations, skin warm/dry/pink. Dr. Summers at       

      bedside. Call light remains within reach.                                                   

11:21 Reassessment: Report given to SHYANNE Argueta.                                                 

                                                                                                  

Vital Signs:                                                                                      

08:12  / 80; Pulse 67; Resp 15; Pulse Ox 95% on R/A;                                    ss  

08:21  / 87; Pulse 111; Resp 17; Temp 98.3(O); Pulse Ox 96% on R/A; Weight 113.4 kg;    ss  

      Height 5 ft. 10 in. (177.80 cm); Pain 4/10;                                                 

10:20  / 73; Pulse 98; Resp 16; Pulse Ox 98% on R/A;                                    ss  

08:21 Body Mass Index 35.87 (113.40 kg, 177.80 cm)                                              

                                                                                                  

ED Course:                                                                                        

08:09 Patient arrived in ED.                                                                  mr  

08:09 Dionicio Summers MD is Attending Physician.                                              kdr 

08:09 Nayla Peñaloza is Private Physician.                                                    mr  

08:12 Patient has correct armband on for positive identification. Bed in low position. Call   ss  

      light in reach.                                                                             

08:21 Iveth Álvarez, RN is Primary Nurse.                                                    ss  

08:23 Triage completed.                                                                       ss  

08:24 Arm band placed on right wrist.                                                         ss  

08:56 Inserted saline lock: 22 gauge in left antecubital area, using aseptic technique.       ss  

      ,using aseptic technique. VIA ultrasound guided.                                            

09:16 CT Abd/Pelvis - IV Contrast Only In Process Unspecified.                                EDMS

10:25 Jeff Shepherd DO is Hospitalizing Provider.                                           kdr 

11:29 No provider procedures requiring assistance completed. Patient admitted, IV remains in  ss  

      place.                                                                                      

                                                                                                  

Administered Medications:                                                                         

08:56 Drug: NS 0.9% 1000 ml Route: IV; Rate: 1 bolus; Site: left antecubital;                   

10:57 Follow up: IV Status: Completed infusion                                                  

08:56 Drug: Zofran (Ondansetron) 4 mg Route: IVP; Site: left antecubital;                     ss  

11:30 Follow up: Response: No adverse reaction; Nausea is decreased                           ss  

10:57 Drug: NS 0.9% 1000 ml Route: IV; Rate: 125 ml/hr; Site: left antecubital;               ss  

10:57 Follow up: IV Status: Infusion continued upon admission                                   

                                                                                                  

                                                                                                  

Outcome:                                                                                          

10:29 Decision to Hospitalize by Provider.                                                    kdr 

11:29 Admitted to Med/surg accompanied by nurse, via wheelchair, with chart, Report called to venu Argueta RN                                                                                 

11:29 Condition: good                                                                             

11:29 Instructed on the need for admit.                                                           

11:30 Patient left the ED.                                                                      

                                                                                                  

Signatures:                                                                                       

Dispatcher [employer]                           EDDionicio Wilson MD MD kdr Rivera, Mary mr Smirch, Shelby, SHYANNE                      RN                                                      

                                                                                                  

Corrections: (The following items were deleted from the chart)                                    

09:29 08:56 Zofran (Ondansetron) 4 mg IVP in right antecubital [facility]  

                                                                                                  

**************************************************************************************************

## 2020-10-02 LAB
BUN BLD-MCNC: 13 MG/DL (ref 7–18)
C DIFF GDH + TOXINS A+B STL QL IA.RAPID: (no result)
GLUCOSE SERPLBLD-MCNC: 98 MG/DL (ref 74–106)
HCT VFR BLD CALC: 42.5 % (ref 39.6–49)
INR BLD: 1.04
LYMPHOCYTES # SPEC AUTO: 2.4 K/UL (ref 0.7–4.9)
MAGNESIUM SERPL-MCNC: 1.6 MG/DL (ref 1.8–2.4)
PMV BLD: 8.9 FL (ref 7.6–11.3)
POTASSIUM SERPL-SCNC: 3.5 MMOL/L (ref 3.5–5.1)
RBC # BLD: 4.73 M/UL (ref 4.33–5.43)
TSH SERPL DL<=0.05 MIU/L-ACNC: 185 UIU/ML (ref 0.36–3.74)

## 2020-10-02 RX ADMIN — ENOXAPARIN SODIUM SCH MG: 40 INJECTION SUBCUTANEOUS at 16:09

## 2020-10-02 RX ADMIN — DEXTROSE AND SODIUM CHLORIDE SCH MLS: 5; .45 INJECTION, SOLUTION INTRAVENOUS at 20:11

## 2020-10-02 RX ADMIN — CIPROFLOXACIN SCH MG: 500 TABLET, FILM COATED ORAL at 20:11

## 2020-10-02 RX ADMIN — Medication SCH ML: at 09:22

## 2020-10-02 RX ADMIN — LACTOBACILLUS TAB SCH TAB: TAB at 09:20

## 2020-10-02 RX ADMIN — Medication SCH ML: at 20:12

## 2020-10-02 RX ADMIN — HYDROCODONE BITARTRATE AND ACETAMINOPHEN PRN TAB: 7.5; 325 TABLET ORAL at 06:18

## 2020-10-02 RX ADMIN — TAZOBACTAM SODIUM AND PIPERACILLIN SODIUM SCH MLS: 250; 2 INJECTION, SOLUTION INTRAVENOUS at 00:46

## 2020-10-02 RX ADMIN — DEXTROSE AND SODIUM CHLORIDE SCH: 5; .45 INJECTION, SOLUTION INTRAVENOUS at 03:06

## 2020-10-02 RX ADMIN — LACTOBACILLUS TAB SCH TAB: TAB at 21:33

## 2020-10-02 RX ADMIN — DEXTROSE AND SODIUM CHLORIDE SCH MLS: 5; .45 INJECTION, SOLUTION INTRAVENOUS at 13:15

## 2020-10-02 RX ADMIN — TAZOBACTAM SODIUM AND PIPERACILLIN SODIUM SCH MLS: 250; 2 INJECTION, SOLUTION INTRAVENOUS at 09:21

## 2020-10-02 RX ADMIN — DEXTROSE AND SODIUM CHLORIDE SCH MLS: 5; .45 INJECTION, SOLUTION INTRAVENOUS at 05:40

## 2020-10-02 RX ADMIN — LACTOBACILLUS TAB SCH TAB: TAB at 13:15

## 2020-10-02 NOTE — P.PN
Subjective


Date of Service: 10/02/20


Primary Care Provider: [facility]


Chief Complaint: Nausea, vomiting and diarrhea





Subjective 


Pt with hypothyroidism, was admitted for diarrhea, Cr 1.1 in Feburary 


pt was on NSAID


TSH >100 








Today 


no overnght events 


cr slightly better 


will start IV synthroid 


can be discharged from nephrology point of view 











 Physical exam 


general: AAOX3, NAD , obese


Neck; Supple, No elevated JVD 


hear: RRR, normal S1,2 no murmur or rub


Chest: CTAB, no rlaes or wheezes  


Abdomen: Soft , Nt 


Extremities No edema or ulcer  











Assessment And Plan:  





Yandel 


Rt kidney atrophy 


due to dehydration and NSAID 


cont IVF 


US: no hydro 


avoid NSAID and contrast 


renal dose meds








HTN 


bp controlled








diarrhea 


improving 


F/u lori studies 











hypothyroidism 


will start on IV synthroid 











Physical Examination





- Vital Signs


Temperature: 97 F


Blood Pressure: 119/70


Pulse: 65


Respirations: 18


Pulse Ox (%): 97

## 2020-10-02 NOTE — P.PN
Subjective


Date of Service: 10/02/20


Primary Care Provider: Brooklyn Enrique


Chief Complaint: Nausea, vomiting and diarrhea


Subjective: Improving, Doing well, Other (Still with diarrhea but this has 

improved.  Patient tolerating clear liquid diet)





Physical Examination





- Vital Signs


Temperature: 97 F


Blood Pressure: 119/70


Pulse: 65


Respirations: 18


Pulse Ox (%): 97





- Physical Exam


General: Alert, In no apparent distress, Oriented x3, Cooperative


HEENT: Atraumatic


Neck: Supple


Respiratory: Clear to auscultation bilaterally, Normal air movement


Cardiovascular: Normal pulses, Regular rate/rhythm


Gastrointestinal: Normal bowel sounds, Soft and benign, Non-distended, No 

masses, No rebound, No guarding, Tenderness (Less pain noted. Very minimal)


Integumentary: No tenderness/swelling, No erythema, No warmth, No cyanosis


Neurological: Normal speech, Normal strength at 5/5 x4 extr, Normal tone, Normal

affect





- Studies


Medications List Reviewed: Yes





Assessment & Plan


Discharge Plan: Home


Plan to discharge in: 24 Hours


Physician Review Additional Text: 








Impression:


Nausea, vomiting and diarrhea with noted right-sided abdominal pain likely 

related to enteritis


Acute renal injury with history of nephrolithiasis and left kidney atrophy and 

right hypertrophic kidney:


Hypothyroidism


Fatty liver





Plan:


Nausea, vomiting and diarrhea with noted right-sided abdominal pain likely 

related to enteritis:  Patient tolerating clear liquid diet.  Will advance diet 

today.  Will start lactobacillus 3 times a day.  Will change IV antibiotic 

therapy to oral Flagyl and Cipro.  Case discussed with surgery yesterday.  

Surgery felt no need for surgical intervention.  Surgery doubted appendicitis 

and likely more related to enteritis.  C diff and stool culture obtained.  Await

culture results.  Encourage ambulation.  Continue IV fluids.  Encourage oral 

intake.  Will adjust IV medications to oral.  Anticipate improvement over the 

next 24 hr.  Likely discharge tomorrow.  I will turn the service over to the 

hospitalist team tomorrow.  I will go over the plan of care with him. 


Acute renal injury with history of nephrolithiasis and left kidney atrophy and 

right hypertrophic kidney:  Continue IV fluids.  Will obtain renal ultrasound.  

Nephrology consulted to further evaluate and address.


Hypothyroidism:  Patient previously on medication.  He has been off medication 

for quite some time.  Will start with IV levothyroxine.  Will change to oral ruthie

orrow.  Will need to obtain prior information on previous medication to 

determine what the patient will require discharge. Will currently start on 

levothyroxine 100 mcg daily.


Fatty liver:  Will provide education.  This can be followed as an outpatient.


Time Spent Managing Pts Care (In Minutes): 55

## 2020-10-02 NOTE — RAD REPORT
EXAM DESCRIPTION:  CT - Abdomen   Pelvis W Contrast - 10/1/2020 10:17 pm

 

CLINICAL HISTORY:  ABD PAIN

 

COMPARISON:  Angio  Aorta For Dissection dated 4/13/2017

 

TECHNIQUE:  Biphasic, helical CT imaging of the abdomen and pelvis was performed following 100 ml non
-ionic IV contrast.

 

No oral contrast was given.

 

All CT scans are performed using dose optimization technique as appropriate and may include automated
 exposure control or mA/KV adjustment according to patient size.

 

FINDINGS:  No suspicious findings in the lung bases.

 

The liver, spleen, and pancreas show no suspicious findings. Liver shows a mild fatty infiltration pa
ttern. No gallbladder or biliary tree abnormality.

 

Right renal function is normal with no pyelonephritis or acute renal parenchymal process. No hydronep
hrosis. Left kidney is fully atrophic with a small 16 millimeter sized focus of renal parenchymal. No
 identifiable function. The remnant renal tissue contains a 2.5 centimeter cyst. No active process se
en. No bladder abnormalities. No adrenal abnormalities.

 

Fluid-filled stomach is present without wall thickening or mass. Gastric outlet obstruction not suspe
cted. A few fluid-filled small bowel loops are present. A nonspecific enteritis is possible. The appe
ndix is upper normal at 8- 9 mm. There is a tear seen within the lumen of the appendix and there is n
o enhancement of the wall. No surrounding inflammatory stranding.

 

  Elsewhere, no free air, pneumatosis, free fluid or other inflammatory stranding seen. Fluid is seen
 in the colon. No active colon process identified.  A small fat only umbilical hernia is present. No 
mass or bulky lymphadenopathy. Left inguinal hernia is seen containing only fat.

 

No suspicious bony findings.

 

Due to technical malfunctions, no report could be generated at the time of the study. Images were rev
iewed and findings telephoned to the referring clinician at the time of the study.

 

IMPRESSION:  No bowel obstruction, free air or surgically emergent finding.

 

Fluid-filled bowel loops are present and a nonspecific enteritis would be a consideration.

 

Mild fatty infiltration of the liver.

 

Appendix is borderline at 8-9 mm but there are no other findings to suspect appendicitis.

 

Atrophic left kidney with no identifiable function.

## 2020-10-02 NOTE — CON
Date of Consultation:  10/01/2020



Additional Consulting Physician:  Jeff Shepherd DO.



Reason For Consultation:  Elevated BUN and creatinine, nephrolithiasis.



History Of Present Illness:  This is a 45-year-old gentleman with significant 
past medical history of nephrolithiasis, hypothyroidism, solitary kidney, 
according to him nonfunctional left kidney.  The patient came to the hospital 
complaining of abdominal pain on the right upper quadrant with nausea, decreased
intake.  No fever or chills.  According to the patient, the patient took 2 
ibuprofen yesterday, otherwise he is not used to take any ibuprofen.  The 
patient's primary workup showed elevation in BUN and creatinine.  For that 
reason, we have been consulted.  There is no history of any IV contrast.



Past Medical History:  

1.   Hypertension.

2.   Hypothyroidism.

3.   Nephrolithiasis.

4.   Solitary kidney.



Past Surgical History:  

1.   Lithotripsy.

2.   Neck surgery.



Family History:  Positive for coronary artery disease.



Social History:  Ex-smoker, active alcohol.  Denies drug abuse.



Review of Systems:

Head and Neck:  No red eye.  No ear pain.  

GI:  Has abdominal pain. 

:  No polyuria, no dysuria, no hematuria. 

GYN:  Not applicable. 

Respiratory:  No shortness of breath. 

Cardiovascular:  No chest pain. 

Endocrine:  No polydipsia. 

Skin:  No rash. 

Neuro:  Has low back pain. 

Musculoskeletal:  No joint pain.



Physical Examination:

Vital Signs:  When I saw the patient, blood pressure of 116/69, pulse of 93, 
afebrile. 

Chest:  Clear to auscultation. 

Heart:  S1, S2 regular. 

Abdomen:  Mild tenderness on the right upper quadrant.  No guarding or rebound. 

Extremities:  No edema. 

Neurological:  Alert and oriented x3.  No focal.



Home Medications:  Negative.



Current Medications:  In the hospital include Lovenox, folic acid, morphine, 
hydrocodone, Zosyn.



Laboratory Data:  WBC 13.7, H and H 18.4/54.  Sodium 137, potassium 3.8, bicarb 
19, BUN 20, creatinine 1.8, GFR of 41, calcium of 9.  Urine analysis; specific 
gravity above 1.030.



Assessment And Plan:  

1.   Acute kidney injury on solitary kidney, mostly secondary to prerenal.  I 
started the patient on hydration and we will monitor the patient, keep avoiding 
any nonsteroidal or any ACE inhibitor.  We will send for renal ultrasound to 
evaluate size of the kidney and to rule out any obstruction.

2.   Solitary kidney as above.  We will follow up the ultrasound.  We will send 
for PTH and uric acid and vitamin D.

3.   Hypertension, currently low blood pressure.  Hold all blood pressure 
medications, especially ACE inhibitor or ARB/diuresis.

4.   Abdominal pain.  Possible appendicitis.  We will follow up with Surgery.  
Antibiotic dose appropriate.



Time spent coordinating the care,  discuss him with all of our team members 
including othere consultant and hospitalist and face-to-face with the patient 
and please go out of 35 min

KENDY

DD:  10/01/2020 23:12:46   Voice ID:  [id number]

DT:  10/02/2020 01:06:55   Report ID:  [id number]

PATTI

## 2020-10-03 VITALS — TEMPERATURE: 97.2 F | SYSTOLIC BLOOD PRESSURE: 113 MMHG | DIASTOLIC BLOOD PRESSURE: 61 MMHG

## 2020-10-03 VITALS — OXYGEN SATURATION: 94 %

## 2020-10-03 LAB
BUN BLD-MCNC: 9 MG/DL (ref 7–18)
GLUCOSE SERPLBLD-MCNC: 97 MG/DL (ref 74–106)
HCT VFR BLD CALC: 39.7 % (ref 39.6–49)
LYMPHOCYTES # SPEC AUTO: 2.8 K/UL (ref 0.7–4.9)
MAGNESIUM SERPL-MCNC: 2 MG/DL (ref 1.8–2.4)
PMV BLD: 8.8 FL (ref 7.6–11.3)
POTASSIUM SERPL-SCNC: 3.7 MMOL/L (ref 3.5–5.1)
RBC # BLD: 4.36 M/UL (ref 4.33–5.43)

## 2020-10-03 RX ADMIN — Medication SCH ML: at 09:07

## 2020-10-03 RX ADMIN — LACTOBACILLUS TAB SCH TAB: TAB at 09:06

## 2020-10-03 RX ADMIN — DEXTROSE AND SODIUM CHLORIDE SCH MLS: 5; .45 INJECTION, SOLUTION INTRAVENOUS at 05:23

## 2020-10-03 RX ADMIN — CIPROFLOXACIN SCH MG: 500 TABLET, FILM COATED ORAL at 09:06

## 2020-10-03 NOTE — P.DS
Admission Date: 10/01/20


Discharge Date: 10/03/20


Primary Care Provider: Dustin Nunze


Disposition: ROUTINE DISCHARGE


Discharge Condition: FAIR


Reason for Admission: Nausea, vomiting and diarrhea


Brief History of Present Illness: 





Patient admitted with gastroenteritis worsening renal function noncompliance 

with levothyroxine


Hospital Course: 





At the time of discharge she is doing well he was seen by a Dr. Flanagan was a 

general surgeon there is no evidence of a acute abdomen in addition was also 

seen by a nephrologist he has a solitary kidney was also taking some 

nonsteroidals this kidney function did improve with some IV fluids at the time 

of discharge patient was alert oriented responsive cooperative denied any 

abdominal pain patient was eating and drinking he was severely hyperthyroid 

patient has will advice to take his Synthroid medication CT scan of the abdomen 

was done no acute findings ultrasound of the kidney shows a atrophic left kidney


Vital Signs/Physical Exam: 














Temp Pulse Resp BP Pulse Ox


 


 96.8 F   65   18   103/53 L  97 


 


 10/03/20 04:00  10/03/20 04:00  10/03/20 04:00  10/03/20 04:00  10/03/20 04:00








General: Alert, Oriented x3


Neck: Supple


Cardiovascular: No edema, Normal S1 S2


Laboratory Data at Discharge: 














WBC  9.3 K/uL (4.3-10.9)   10/03/20  05:39    


 


Hgb  13.3 g/dL (13.6-17.9)  L  10/03/20  05:39    


 


Hct  39.7 % (39.6-49.0)   10/03/20  05:39    


 


Plt Count  225 K/uL (152-406)   10/03/20  05:39    


 


PT  12.3 SECONDS (9.5-12.5)   10/02/20  05:25    


 


INR  1.04   10/02/20  05:25    


 


Sodium  141 mmol/L (136-145)   10/03/20  05:39    


 


Potassium  3.7 mmol/L (3.5-5.1)   10/03/20  05:39    


 


BUN  9 mg/dL (7-18)   10/03/20  05:39    


 


Creatinine  1.50 mg/dL (0.55-1.3)  H  10/03/20  05:39    


 


Glucose  97 mg/dL ()   10/03/20  05:39    


 


Magnesium  2.0 mg/dL (1.8-2.4)   10/03/20  05:39    


 


Total Bilirubin  0.8 mg/dL (0.2-1.0)   10/01/20  08:50    


 


AST  11 U/L (15-37)  L  10/01/20  08:50    


 


ALT  30 U/L (12-78)   10/01/20  08:50    


 


Alkaline Phosphatase  75 U/L ()   10/01/20  08:50    


 


Lipase  39 U/L ()  L  10/01/20  08:50    








Home Medications: 








Atorvastatin Calcium 20 mg PO BEDTIME 10/02/20 


Atorvastatin Calcium [Lipitor*] 20 mg PO BEDTIME #90 tab 10/03/20 


Levothyroxine Sodium 200 mcg PO DAILY 90 Days #90 10/03/20 


Levothyroxine [Synthroid*] 200 mcg PO DAILYAC 90 Days #90 tab 10/03/20 





New Medications: 


Levothyroxine Sodium 200 mcg PO DAILY 90 Days #90


Atorvastatin Calcium [Lipitor*] 20 mg PO BEDTIME #90 tab


Levothyroxine [Synthroid*] 200 mcg PO DAILYAC 90 Days #90 tab


Patient Discharge Instructions: Patient to avoid nonsteroidals take his thyroid 

medication follow-up with is primary care doctors in the nephrologists take over

 the counter Imodium.  Patient has been non compliant with his levothyroxine and

 Lipitor medications have been faxed to his pharmacy


Diet: Regular


Activity: Ad mega


Followup: 


Pedro Giordano MD [ACTIVE - CAN ADMIT] - 1-2 Weeks


(nephrologist- call to schedule an appointment


______________________________________________________________)


Nayla Peñaloza NP [Primary Care Provider] - 1 Week


(PCP- call to schedule an appointment


_______________________________________________________)

## 2022-06-03 ENCOUNTER — HOSPITAL ENCOUNTER (EMERGENCY)
Dept: HOSPITAL 97 - ER | Age: 47
Discharge: HOME | End: 2022-06-03
Payer: SELF-PAY

## 2022-06-03 VITALS — DIASTOLIC BLOOD PRESSURE: 85 MMHG | OXYGEN SATURATION: 98 % | TEMPERATURE: 98.9 F | SYSTOLIC BLOOD PRESSURE: 136 MMHG

## 2022-06-03 DIAGNOSIS — R05.9: ICD-10-CM

## 2022-06-03 DIAGNOSIS — I10: ICD-10-CM

## 2022-06-03 DIAGNOSIS — J02.9: ICD-10-CM

## 2022-06-03 DIAGNOSIS — H65.01: Primary | ICD-10-CM

## 2022-06-03 DIAGNOSIS — Z20.822: ICD-10-CM

## 2022-06-03 PROCEDURE — 87804 INFLUENZA ASSAY W/OPTIC: CPT

## 2022-06-03 PROCEDURE — 99284 EMERGENCY DEPT VISIT MOD MDM: CPT

## 2022-06-03 PROCEDURE — 87081 CULTURE SCREEN ONLY: CPT

## 2022-06-03 PROCEDURE — 96372 THER/PROPH/DIAG INJ SC/IM: CPT

## 2022-06-03 PROCEDURE — 87070 CULTURE OTHR SPECIMN AEROBIC: CPT

## 2022-06-03 NOTE — XMS REPORT
Continuity of Care Document

                             Created on:Vita 3, 2022



Patient:SONAM ALBA

Sex:Male

:1975

External Reference #:[id number]





Demographics







                          Address                   [address] [place] [id number]

 

                          Home Phone                [phone]

 

                          Work Phone                [phone]

 

                          Email Address             [email or web address]

 

                          Preferred Language        English

 

                          Marital Status            Unknown

 

                          Zoroastrian Affiliation     Unknown

 

                          Race                      Unknown

 

                          Additional Race(s)        Unavailable

 

                          Ethnic Group              Unknown









Author







                          Organization              [facility]

t

 

                          Address                   [address]



                                                    [place] [address]

 

                          Phone                     [phone]









Care Team Providers







                    Name                Role                Phone

 

                    Unavailable         Unavailable         Unavailable









Problems

This patient has no known problems.



Allergies, Adverse Reactions, Alerts

This patient has no known allergies or adverse reactions.



Medications

This patient has no known medications.



Procedures

This patient has no known procedures.



Results







           Test Description Test Time  Test Comments Results    Result Comments 

Source









                    TSH, THIRD GENERATION 2022 04:33:43 









                      Test Item  Value      Reference Range Interpretation Comme

nts









             TSH, THIRD GENERATION (test 79.900 UIU/ML 0.400-4.100  H           

        UNLESS OTHERWISE



             code = 2821)                                        INDICATED, ALL 

TESTING



                                                                 PERFORMED [facility] PATHOLOGY



                                                                 LABORATORIES, [address] 

[address]



                                                                 LABORATORY DIRE

CTOR:  SRI RUBIO M.D.

      CLIA



                                                                 NUMBER [id number]

[id number]  CAP



                                                                 ACCREDITATION N

O. [id number]



H. PYLORI (BREATH)2021 13:37:10





             Test Item    Value        Reference Range Interpretation Comments

 

             H. PYLORI (BREATH) (test code = NEGATIVE     NEGATIVE              

    



             [address])                                              



CBC W/AUTO DIFF WITH [id number] 11:02:50





             Test Item    Value        Reference Range Interpretation Comments

 

             WBC (test code = TEST NOT     3.5-11.0                  Unable to p

erform



             1001)        PERFORMED K/UL                           testing, spec

imen



                                                                 clotted.Charges



                                                                 adjusted as



                                                                 applicable.

 

             RBC (test code = TEST NOT     4.50-6.10                 



             1002)        PERFORMED M/UL                           

 

             HEMOGLOBIN (test TEST NOT     13.5-17.0                 



             code = 1003) PERFORMED G/DL                           

 

             HEMATOCRIT (test TEST NOT     40.0-51.0                 



             code = 1004) PERFORMED %                            

 

             MCV (test code = TEST NOT     80.0-99.0                 



             1005)        PERFORMED fL                           

 

             MCH (test code = TEST NOT     25.0-33.0                 



             1006)        PERFORMED PG                           

 

             MCHC (test code = TEST NOT     31.0-36.0                 



             1007)        PERFORMED G/DL                           

 

             RDW (test code = TEST NOT     11.5-15.0                 



             1038)        PERFORMED %                            

 

             NEUTROPHILS (test TEST NOT                                NOTE: EFF

ECTIVE



             code = 1008) PERFORMED %                            2021,



                                                                 REFERENCE INTER

VALS



                                                                 AND FLAGGING



                                                                 FORRELATIVE (%)

 WBC



                                                                 DIFFERENTIAL WI

LL



                                                                 BE ELIMINATED A

S



                                                                 REDUNDANT



                                                                 TOABSOLUTE



                                                                 COUNTS.SEE



                                                                 [email or web address]

[email or web address]



                                                                 [email or web address]

g_up



                                                                 date

 

             LYMPHOCYTES (test TEST NOT                               



             code = 1010) PERFORMED %                            

 

             MONOCYTES (test TEST NOT                               



             code = 1011) PERFORMED %                            

 

             EOSINOPHILS (test TEST NOT                               



             code = 1012) PERFORMED %                            

 

             BASOPHILS (test TEST NOT                               



             code = 1013) PERFORMED %                            

 

             PLATELET COUNT TEST NOT     130-400                   



             (test code = 1015) PERFORMED K/UL                           

 

             ABSOLUTE     TEST NOT     1.50-7.50                 



             NEUTROPHILS (test PERFORMED K/UL                           



             code = 1066)                                        

 

             ABSOLUTE     TEST NOT     1.00-4.00                 



             LYMPHOCYTES (test PERFORMED K/UL                           



             code = 1067)                                        

 

             ABSOLUTE MONOCYTES TEST NOT     0.20-1.00                 



             (test code = 1068) PERFORMED K/UL                           

 

             ABSOLUTE     TEST NOT     0.00-0.50                 



             EOSINOPHILS (test PERFORMED K/UL                           



             code = 1040)                                        

 

             ABSOLUTE BASOPHILS TEST NOT     0.00-0.20                 



             (test code = 1069) PERFORMED K/UL                           



COMPREHENSIVE METABOLIC [id number] 06:47:24





             Test Item    Value        Reference Range Interpretation Comments

 

             GLUCOSE (test code = 95 MG/DL     70-99                     



             )                                               

 

             BUN (test code = 17 MG/DL     -2208)                                               

 

             CREATININE (test 1.15 MG/DL   0.80-1.40                  EFFECTIVE



             code = 2214)                                        2021, [facility]

 HAS



                                                                 IMPLEMENTED THE



                                                                 NKF-ASN RECOMME

NDED



                                                                 KD-EPI EGF

R



                                                                 REFIT CALCULATI

ON



                                                                 THAT DOES NOT



                                                                 INCLUDE A



                                                                 COEFFICIENT FOR

RACE.



                                                                 FOR MORE



                                                                 INFORMATION, SE

E



                                                                 ANNOUNCEMENT



                                                                 [email or web address]

[employer]



                                                                 [email or web address]

 

             eGFR ( CKD-EPI) 80 ML/MIN/1.73 >60                       



             (test code = 17694)                                        

 

             CALC BUN/CREAT (test 15 RATIO     -28                      



             code = 2235)                                        

 

             SODIUM (test code = 139 MEQ/L    133-146                   



             )                                               

 

             POTASSIUM (test code 4.6 MEQ/L    3.5-5.4                   



             = )                                             

 

             CHLORIDE (test code 103 MEQ/L                        



             = 2215)                                             

 

             CARBON DIOXIDE (test 23 MEQ/L     19-31                     



             code = 220)                                        

 

             CALCIUM (test code = 9.9 MG/DL    8.5-10.5                  



             )                                               

 

             PROTEIN, TOTAL (test 7.6 G/DL     6.1-8.3                   



             code = 222)                                        

 

             ALBUMIN (test code = 4.6 G/DL     3.5-5.2                   



             )                                               

 

             CALC GLOBULIN (test 3.0 G/DL     1.9-3.7                   



             code = 2240)                                        

 

             CALC A/G RATIO (test 1.5 RATIO    1.0-2.6                   



             code = 2234)                                        

 

             BILIRUBIN, TOTAL 0.3 MG/DL    See_Comment                [Automated

 message]



             (test code = )                                        The syste

m which



                                                                 generated this



                                                                 result transmit

[employer]



                                                                 reference range

:



                                                                 <=1.2. The refe

rence



                                                                 range was not u

sed



                                                                 to interpret th

is



                                                                 result as



                                                                 normal/abnormal

.

 

             ALKALINE PHOSPHATASE 92 U/L                           



             (test code = )                                        

 

             AST (test code = 25 U/L       9-50                      



             )                                               

 

             ALT (test code = 24 U/L       5-50                      



             )                                               



[id number] 06:47:17





             Test Item    Value        Reference Range Interpretation Comments

 

             AMYLASE (test code = ) 31 U/L                           



[id number] 06:47:17





             Test Item    Value        Reference Range Interpretation Comments

 

             LIPASE (test code = 15 U/L       13-60                            U

NLESS OTHERWISE



             )                                               INDICATED, ALL 

TESTING



                                                                 PERFORMED [facility]

NICAL



                                                                 PATHOLOGY LABOR

[facility]



                                                                 [facility]  [address] [place] [id number]



                                                                 LABORATORY DIRE

CTOR:  ANNA MARIE LORA.



                                                                 CLIA NUMBER [id number]

[id number]  CAP



                                                                 ACCREDITATION N

O. [id number]

## 2022-06-03 NOTE — EDPHYS
Physician Documentation                                                                           

 [facility]                                                                 

Name: Varghese Johnston                                                                               

Age: 46 yrs                                                                                       

Sex: Male                                                                                         

: 1975                                                                                   

MRN: [id number]                                                                                   

Arrival Date: 2022                                                                          

Time: 11:58                                                                                       

Account#: [id number]                                                                            

Bed 11                                                                                            

Private MD:                                                                                       

LINDA Physician Leo Taylor                                                                      

HPI:                                                                                              

                                                                                             

12:16 This 46 yrs old Male presents to ER via Ambulatory with complaints of Chest Congestion, jmm 

      Nasal Congestion, Cough, Chest Pressure.                                                    

12:16 The patient or guardian reports cough. Onset: The symptoms/episode began/occurred       jmm 

      gradually, 3 day(s) ago. Modifying factors: The symptoms are alleviated by nothing. the     

      symptoms are aggravated by nothing. Associated signs and symptoms: Pertinent positives:     

      earache, sore throat. The patient has not experienced similar symptoms in the past.         

                                                                                                  

Historical:                                                                                       

- Allergies:                                                                                      

12:13 No Known Allergies;                                                                     vg1 

- PMHx:                                                                                           

12:13 Broken Neck; Hypercholesterolemia; Hypertension; Hypothyroidism; Kidney stones;         vg1 

- PSHx:                                                                                           

12:13 Neck sx;                                                                                vg1 

                                                                                                  

- Immunization history:: Client reports having NOT received the Covid vaccine.                    

- Social history:: Smoking status: Reported history of juuling and/or vaping.                     

                                                                                                  

                                                                                                  

ROS:                                                                                              

12:16 Constitutional: Positive for body aches, chills.                                        jmm 

12:16 ENT: Positive for ear pain, sore throat.                                                    

12:16 Respiratory: Positive for cough.                                                            

12:16 All other systems are negative.                                                             

                                                                                                  

Exam:                                                                                             

12:16 Constitutional:  This is a well developed, well nourished patient who is awake, alert,  jmm 

      and in no acute distress. Head/Face:  atraumatic. Eyes:  EOMI, no conjunctival erythema     

      appreciated                                                                                 

12:16 Neck:  Trachea midline, Supple Chest/axilla:  Normal chest wall appearance and motion.      

                                                                                                  

12:16 Back:  Normal ROM Skin:  General appearance color normal MS/ Extremity:  Moves all          

      extremities, no obvious deformities appreciated, no edema noted to the lower                

      extremities  Neuro:  Awake and alert Psych:  Behavior is normal, Mood is normal,            

      Patient is cooperative and pleasant                                                         

12:16 ENT: TM's: erythema, that is moderate, on the right.                                        

12:16 Cardiovascular: Rate: normal, Rhythm: regular.                                              

12:16 Respiratory: the patient does not display signs of respiratory distress,  Respirations:     

      normal, Breath sounds: are clear throughout.                                                

12:16 Abdomen/GI: Inspection: abdomen appears normal, Bowel sounds: normal, Palpation:            

      abdomen is soft and non-tender, in all quadrants.                                           

                                                                                                  

Vital Signs:                                                                                      

12:11  / 85; Pulse 90; Resp 20; Temp 98.9(O); Pulse Ox 98% on R/A; Weight 113.4 kg;     vg1 

      Height 5 ft. 10 in. (177.80 cm);                                                            

12:11 Body Mass Index 35.87 (113.40 kg, 177.80 cm)                                            vg1 

                                                                                                  

MDM:                                                                                              

13:35 Patient medically screened.                                                             [facility] 

14:11 Data reviewed: vital signs, nurses notes. Counseling: I had a detailed discussion with  [facility] 

      the patient and/or guardian regarding: the historical points, exam findings, and any        

      diagnostic results supporting the discharge/admit diagnosis, lab results, the need for      

      outpatient follow up, to return to the emergency department if symptoms worsen or           

      persist or if there are any questions or concerns that arise at home.                       

                                                                                                  

                                                                                             

12:15 Order name: SARS-COV-2 RT PCR (Document "Date of Onset" if Symptomatic); Complete Time: vg1 

      14:03                                                                                       

                                                                                             

12:15 Order name: Flu; Complete Time: 13:11                                                   1 

                                                                                             

12:15 Order name: Strep; Complete Time: 13:11                                                 [facility] 

                                                                                             

12:44 Order name: Throat Culture                                                              [facility]

                                                                                                  

Administered Medications:                                                                         

14:12 Drug: Decadron (dexamethasone) 10 mg Route: IM; Site: right gluteus;                    ll1 

14:21 Follow up: Response: No adverse reaction                                                ll1 

                                                                                                  

                                                                                                  

Disposition Summary:                                                                              

22 14:12                                                                                    

Discharge Ordered                                                                                 

      Location: Home                                                                          [facility] 

      Condition: Stable                                                                       [facility] 

      Diagnosis                                                                                   

        - Acute serous otitis media, right ear                                                m 

        - Acute pharyngitis, unspecified                                                      [facility] 

        - Cough                                                                               [facility] 

      Followup:                                                                               [facility] 

        - With: Private Physician                                                                  

        - When: 2 - 3 days                                                                         

        - Reason: Recheck today's complaints, Continuance of care, Re-evaluation by your           

      physician                                                                                   

      Discharge Instructions:                                                                     

        - Discharge Summary Sheet                                                             [facility] 

        - Otitis Media, Adult                                                                 jmm 

        - Sore Throat                                                                         [facility] 

      Forms:                                                                                      

        - Medication Reconciliation Form                                                      [facility] 

        - Thank You Letter                                                                    [facility] 

        - Antibiotic Education                                                                [facility] 

        - Prescription Opioid Use                                                             [facility] 

      Prescriptions:                                                                              

        - cefdinir 300 mg Oral capsule                                                             

            - take 1 capsule by ORAL route every 12 hours for 10 days; 20 capsule; Refills:   jmm 

      0, Product Selection Permitted                                                              

        - promethazine-DM                                                                          

            - take 5 milliliter by ORAL route every 4 hours As needed; 120 milliliter;        jmm 

      Refills: 0, Product Selection Permitted                                                     

        - albuterol sulfate 90 mcg/actuation Inhalation HFA aerosol inhaler                        

            - inhale 2 puff by INHALATION route every 4 hours; 1 Pump; Refills: 0, Product    jmm 

      Selection Permitted                                                                         

Signatures:                                                                                       

Dispatcher [employer]                           EDJosue Dee PA PA jmm Garcia, Victoria RN                    RN   vg1                                                  

Lizzie Farley RN                       RN   ll1                                                  

                                                                                                  

**************************************************************************************************

## 2022-06-03 NOTE — ER
Nurse's Notes                                                                                     

 [facility]                                                                 

Name: Varghese Johnston                                                                               

Age: 46 yrs                                                                                       

Sex: Male                                                                                         

: 1975                                                                                   

MRN: [id number]                                                                                   

Arrival Date: 2022                                                                          

Time: 11:58                                                                                       

Account#: [id number]                                                                            

Bed 11                                                                                            

Private MD:                                                                                       

Diagnosis: Acute serous otitis media, right ear;Acute pharyngitis, unspecified;Cough              

                                                                                                  

Presentation:                                                                                     

                                                                                             

12:11 Chief complaint: Patient states: nasal drainage, cough, congestion, and chest pressure  vg1 

      x 3 days; denies NVD. Coronavirus screen: Vaccine status: Patient reports being             

      unvaccinated. Client denies travel out of the [place] in the last 14 days. Ebola Screen:       

      Patient denies exposure to infectious person. Patient denies travel to an                   

      Ebola-affected area in the 21 days before illness onset. Initial Sepsis Screen: Does        

      the patient meet any 2 criteria? No. Patient's initial sepsis screen is negative. Does      

      the patient have a suspected source of infection? No. Patient's initial sepsis screen       

      is negative. Risk Assessment: Do you want to hurt yourself or someone else? Patient         

      reports no desire to harm self or others. Onset of symptoms was May 31, 2022.               

12:11 Method Of Arrival: Ambulatory                                                           vg1 

12:11 Acuity: AGAPITO 4                                                                           vg1 

                                                                                                  

Triage Assessment:                                                                                

12:13 General: Appears uncomfortable, Behavior is calm, cooperative. Pain: Complains of pain  vg1 

      in chest and throat Pain currently is 6 out of 10 on a pain scale. Pain began 2-3 days      

      ago. Cardiovascular: Patient's skin is warm and dry. Respiratory: Reports cough that is     

      productive, pain with cough Airway is patent Respiratory effort is even, unlabored.         

                                                                                                  

Historical:                                                                                       

- Allergies:                                                                                      

12:13 No Known Allergies;                                                                     vg1 

- PMHx:                                                                                           

12:13 Broken Neck; Hypercholesterolemia; Hypertension; Hypothyroidism; Kidney stones;         vg1 

- PSHx:                                                                                           

12:13 Neck sx;                                                                                vg1 

                                                                                                  

- Immunization history:: Client reports having NOT received the Covid vaccine.                    

- Social history:: Smoking status: Reported history of juuling and/or vaping.                     

                                                                                                  

                                                                                                  

Screenin:07 Abuse screen: Denies threats or abuse. Nutritional screening: No deficits noted.        ll1 

      Tuberculosis screening: No symptoms or risk factors identified. Fall Risk Total Anand       

      Fall Scale indicates No Risk (0-24 pts).                                                    

                                                                                                  

Assessment:                                                                                       

14:05 Reassessment: No changes from previously documented assessment. Patient and/or family   ll1 

      updated on plan of care and expected duration. Pain level reassessed. Patient is alert,     

      oriented x 3, equal unlabored respirations, skin warm/dry/pink.                             

14:12 Pain: Pain does not radiate.                                                            ll1 

                                                                                                  

Vital Signs:                                                                                      

12:11  / 85; Pulse 90; Resp 20; Temp 98.9(O); Pulse Ox 98% on R/A; Weight 113.4 kg;     vg1 

      Height 5 ft. 10 in. (177.80 cm);                                                            

12:11 Body Mass Index 35.87 (113.40 kg, 177.80 cm)                                            vg1 

                                                                                                  

ED Course:                                                                                        

11:58 Patient arrived in ED.                                                                  jj6 

12:10 Josue Sumner PA is PHCP.                                                              sarkis 

12:10 Leo Taylor MD is Attending Physician.                                             [facility] 

12:13 Triage completed.                                                                       1 

12:13 Arm band placed on.                                                                     1 

14:05 Lizzie Farley RN is Primary Nurse.                                                     ll1 

14:05 Patient placed in an exam room, on a stretcher.                                         ll1 

14:07 Patient has correct armband on for positive identification. Cardiac monitoring not      ll1 

      applicable on this patient.                                                                 

14:12 No provider procedures requiring assistance completed. Patient did not have IV access   ll1 

      during this emergency room visit. Patient maintains SpO2 saturation greater than 95% on     

      room air.                                                                                   

                                                                                                  

Administered Medications:                                                                         

14:12 Drug: Decadron (dexamethasone) 10 mg Route: IM; Site: right gluteus;                    1 

14:21 Follow up: Response: No adverse reaction                                                ll1 

                                                                                                  

                                                                                                  

Medication:                                                                                       

14:07 VIS not applicable for this client.                                                     ll1 

                                                                                                  

Outcome:                                                                                          

14:12 Discharge ordered by MD.                                                                sarkis 

14:12 Discharged to home ambulatory.                                                          ll1 

14:12 Condition: stable                                                                           

14:12 Discharge instructions given to patient, Instructed on discharge instructions, follow       

      up and referral plans. medication usage, Demonstrated understanding of instructions,        

      follow-up care, medications, Prescriptions given X 3.                                       

14:21 Patient left the ED.                                                                    1 

                                                                                                  

Signatures:                                                                                       

Josue Sumner PA PA jmm Garcia, Victoria, RN                    RN   1                                                  

Lizzie Farley RN                       RN   1                                                  

Shae Wiley                           jj6                                                  

                                                                                                  

**************************************************************************************************